# Patient Record
(demographics unavailable — no encounter records)

---

## 2025-05-23 NOTE — HISTORY OF PRESENT ILLNESS
[TextBox_4] : 50-year-old female who was last seen 9/2024. The patient has severe restrictive ventilatory impairment likely pulmonary fibrosis. She also has severe coronary artery disease having undergone coronary bypass grafting, stent placement with abnormal systolic function, currently on furosemide, spironolactone, digitalis, beta-blocker, losartan and lisinopril. She has been taking care of her father for the last 2 years who had dementia and who eventually passed.   At last visit was c/o increasing dyspnea but cough is not a predominant symptom. She does use supplemental oxygen at night Workup for etiology for interstitial lung disease was unremarkable. She was unresponsive to prednisone and she demonstrated oxygen desaturation when she walked. In addition to prednisone not working, she had no response to Symbicort or 2 albuterol.  She was advised to have a CT chest, pap titration with co2 monitoring, pft and labs.  CT chest was done at [facility] but we have not received results. sleep study was not done. PFTs done today.  Says she feels better. Only using albuterol prn. Hasnt seen cardio close to a yr.  occasional cough clear sputum, no wheezing, no interim illness.

## 2025-05-23 NOTE — ASSESSMENT
[FreeTextEntry1] : 50F with severe restrictive ventilatory impairment and possibly pulmonary fibrosis. She has severe CAD with hx of of CABG, stents and  has abnormal systolic function. She is overweight and currently undergoing workup for DM. She also has sleep apnea. At last visit we asked her to schedule a pap tiration with tco2 monitoring, which she hasnt scheduled. CT chest was done recently at [facility] but we have not received report.  On Exam, lungs are clear and spo2 normal.  Plan: albuterol prn follow up with cardio schedule pap titration with tco2 monitoring Pt called radiology and they will be sending us the report and CD. Report does not suggest significant interstitial disease.  I suspect that a great deal of her reduced lung function is the result of her significant cardiomegaly and her obesity.  Since she has diabetes and sleep apnea, she would clearly benefit also from a GLP-1 agonist